# Patient Record
Sex: FEMALE | Race: WHITE | NOT HISPANIC OR LATINO | Employment: STUDENT | URBAN - METROPOLITAN AREA
[De-identification: names, ages, dates, MRNs, and addresses within clinical notes are randomized per-mention and may not be internally consistent; named-entity substitution may affect disease eponyms.]

---

## 2023-10-02 ENCOUNTER — TELEPHONE (OUTPATIENT)
Dept: ENDOCRINOLOGY | Facility: CLINIC | Age: 28
End: 2023-10-02
Payer: COMMERCIAL

## 2023-10-02 DIAGNOSIS — L70.9 ACNE, UNSPECIFIED ACNE TYPE: Primary | ICD-10-CM

## 2023-10-03 RX ORDER — SPIRONOLACTONE 100 MG/1
100 TABLET, FILM COATED ORAL 2 TIMES DAILY
COMMUNITY
End: 2023-10-03 | Stop reason: SDUPTHER

## 2023-10-03 RX ORDER — SPIRONOLACTONE 100 MG/1
100 TABLET, FILM COATED ORAL 2 TIMES DAILY
Qty: 30 TABLET | Refills: 3 | Status: SHIPPED | OUTPATIENT
Start: 2023-10-03 | End: 2023-12-04

## 2023-10-03 NOTE — TELEPHONE ENCOUNTER
Called to say [place] told her she couldn't schedule till January. She is asking for a courtesy refill of her spironolactone to go to [facility] at [address] till she can be seen.    She was seen last October. There is currently no appointment scheduled. I will have to call her to schedule for next available.

## 2023-12-03 DIAGNOSIS — L70.9 ACNE, UNSPECIFIED ACNE TYPE: ICD-10-CM

## 2023-12-04 RX ORDER — SPIRONOLACTONE 100 MG/1
100 TABLET, FILM COATED ORAL 2 TIMES DAILY
Qty: 30 TABLET | Refills: 3 | Status: SHIPPED | OUTPATIENT
Start: 2023-12-04 | End: 2024-01-10 | Stop reason: SDUPTHER

## 2023-12-08 NOTE — PROGRESS NOTES
FU for PCOS. LV with me in 10/2022.    History of Present Illness   28 y.o. female  with hx of PCOS.  She was told she has borderline PCOS when she was younger.     Periods: mostly regular but cycle length can vary  HIrsutism: no  Acne: yes, cystic  Weight gain: no     Reports having been told that she has cysts on her ovaries in the past.     Prior work-up: 04/2018 ([facility])  Total Testosterone: 34 ng/dL (9-55)  Free Testosterone: 4.2 pg/mL (0.8-7.4)  DHEAS: 408.9 ug/dL (148-407)  17-OHP: 24.2 ng/mL  HbA1c: NA     3/10/2022  Total T: 23  Free: 2.8  DHEAS: 275  HbA1c 4.7%    ROS  General: no fever or chills  CV: no chest pain   Respiratory: no shortness of breath  MSK: no lower extremity edema  Neuro: no headache or dizziness  See [facility] for Endocrine ROS    Physical Exam   vitals were not taken for this visit.   General: not in acute distress  HEENT: NANDO OVIEDO  Thyroid: no goiter  Neuro: alert and oriented x 3    Current Outpatient Medications   Medication Sig Dispense Refill    spironolactone (Aldactone) 100 mg tablet TAKE 1 TABLET BY MOUTH TWICE A DAY 30 tablet 3     No current facility-administered medications for this visit.       Assessment and Plan  28 y.o. female here for follow-up of possible PCOS.     1. Possible PCOS  2. Cystic acne  Was told she has borderline PCOS when she was younger.  Seeing a dermatologist for her acne and was hold that they are likely due to a hormonal abnormality.  Prior work up at [facility] in 04/2018 showed normal total and free testosterone levels, minimally elevated DHEAS, and normal 17OHP.  Periods are relatively regular although cycle length does vary from 22-36 days.  No hirsutism or obesity but has acne .     Labs from 3/10/2022  Total T: 23  Free: 2.8  DHEAS: 275  HbA1c 4.7%  K 4.3  Cre 0.67    Labs from 10/2022  K 4.1  Cre 0.78     Possible PCOS.   Currently takes spironolactone 100 mg BID for acne.     She is aware that she should not get pregnant on spironolactone. No plans for  pregnancy for another 2-3 years.  Using condoms for protection. She has suffered from mood changes on YARELI in the past.  She is planning to get an IUD placed.     PLAN:  -check CMP  -continue spironolactone 100 mg BID  -encouraged IUD placement

## 2023-12-13 ENCOUNTER — APPOINTMENT (OUTPATIENT)
Dept: ENDOCRINOLOGY | Facility: CLINIC | Age: 28
End: 2023-12-13
Payer: COMMERCIAL

## 2024-01-05 NOTE — PROGRESS NOTES
FUV for PCOS. LV with me 10/2022.     History of Present Illness  Andie Floyd is a 28 y.o. female with hx of possible PCOS.     Periods: mostly regular but cycle length can vary. 14- 25 day cycle   Hirsutism: none  Acne: previously dealt with cystic acne but a few months ago went online and started tretinoin cream (does not see dermatologist for this ). States that this has helped her tremendously.   Weight gain: none  Hair : No hair loss   Contraception : Currently using condoms . She has not had the time to make an appointment for copper IUD . Previously did not tolerate oral contraception      Reports having been told that she has cysts on her ovaries in the past.Previous ultrasound pelvis on file from 2008 but unable to view report or images.     Prior work-up: 04/2018 ([facility])  Total Testosterone: 34 ng/dL (9-55)  Free Testosterone: 4.2 pg/mL (0.8-7.4)  DHEAS: 408.9 ug/dL (148-407)  Cortisol : 8.7(at 11am)  ACTH: 12  Androstenedione: 3.6  17-OHP: 24.2 ng/mL  HbA1c: NA    (2020)- TSH 1.16, FT4 1.4  2013- prolactin 3.6, Testosterone 38( ref 20-70), elevated free Testosterone 9.7, dheas 500, insulin 2.8  2008 - LH 3, FSH 5.7    3/10/2022  Total T: 23  Free: 2.8  DHEAS: 275  HbA1c 4.7%    Lcv 10/2022 where her dose of spironolactone was increased to 100 mg BID.  She denies any complaints with this. Her acne is improving.     Labs   Latest Reference Range & Units 10/31/22 12:26   GLUCOSE 74 - 99 mg/dL 82   SODIUM 136 - 145 mmol/L 139   POTASSIUM 3.5 - 5.3 mmol/L 4.1   CHLORIDE 98 - 107 mmol/L 103   Bicarbonate 21 - 32 mmol/L 29   Anion Gap 10 - 20 mmol/L 11   Blood Urea Nitrogen 6 - 23 mg/dL 11   Creatinine 0.50 - 1.05 mg/dL 0.78   Calcium 8.6 - 10.6 mg/dL 9.5   Albumin 3.4 - 5.0 g/dL 4.3   Alkaline Phosphatase 33 - 110 U/L 54   ALT 7 - 45 U/L 14   AST 9 - 39 U/L 18   Bilirubin Total 0.0 - 1.2 mg/dL 0.6   Total Protein 6.4 - 8.2 g/dL 6.9     ROS  General: no fever or chills  CV: no chest pain   Respiratory: no  "shortness of breath  MSK: no lower extremity edema  Neuro: no headache or dizziness  See HPI for Endocrine ROS    No past medical history on file.    No past surgical history on file.    Social History     Socioeconomic History    Marital status: Single     Spouse name: Not on file    Number of children: Not on file    Years of education: Not on file    Highest education level: Not on file   Occupational History    Not on file   Tobacco Use    Smoking status: Never     Passive exposure: Never    Smokeless tobacco: Never   Substance and Sexual Activity    Alcohol use: Not on file    Drug use: Not on file    Sexual activity: Not on file   Other Topics Concern    Not on file   Social History Narrative    Not on file     Social Determinants of Health     Financial Resource Strain: Not on file   Food Insecurity: Not on file   Transportation Needs: Not on file   Physical Activity: Not on file   Stress: Not on file   Social Connections: Not on file   Intimate Partner Violence: Not on file   Housing Stability: Not on file       Physical Exam   height is 1.772 m (5' 9.75\") and weight is 68.5 kg (151 lb). Her blood pressure is 106/60 and her pulse is 61.   General: not in acute distress  HEENT: PREET, EOMI, chvostek positive  Thyroid: no goiter  Neuro: alert and oriented x 3, no delayed DTRs   Respiratory : No increased wob   Extremities : No LE edema     Current Outpatient Medications   Medication Sig Dispense Refill    cyanocobalamin, vitamin B-12, 2,500 mcg tablet,chewable Chew 2,500 mcg once daily.      spironolactone (Aldactone) 100 mg tablet TAKE 1 TABLET BY MOUTH TWICE A DAY 30 tablet 3    tretinoin (Retin-A) 0.025 % cream Apply topically once daily at bedtime.      valACYclovir (Valtrex) 1 gram tablet Take 0.5 tablets (500 mg) by mouth if needed.       No current facility-administered medications for this visit.       Assessment and Plan  28 y.o. female with hx of possible PCOS, here for management of the same.     1. " Possible PCOS  2. Cystic acne  Was told she has borderline PCOS when she was younger.  Seeing a dermatologist for her acne and was hold that they are likely due to a hormonal abnormality.  Prior work up at [facility] in 04/2018 showed normal total and free testosterone levels, minimally elevated DHEAS, and normal 17OHP.  Periods are relatively regular although cycle length does vary from 22-36 days.  No hirsutism or obesity but has acne.     Labs from 3/10/2022  Total T: 23  Free: 2.8  DHEAS: 275  HbA1c 4.7%  K 4.3  Cre 0.67     Possible PCOS.   Patient was started on spironolactone 150 mg QDAY by dermatologist.   Currently takes 100 mg BID and tetrinoin cream for acne.  Doing well.     She is aware that she should not get pregnant on spironolactone.  Using condoms for protection. She has suffered from mood changes on YARELI in the past.     PLAN:  -check CMP, HbA1c  -continue spironolactone 100 mg BID  -eecommend IUD placement  -advised patient to establish care with PCP     Follow-up in 1 year   Uses [facility].    I saw and evaluated the patient. I personally obtained the key and critical portions of the history and physical exam or was physically present for key and critical portions performed by the resident/fellow. I reviewed the resident/fellow's documentation and discussed the patient with the resident/fellow. I agree with the resident/fellow's medical decision making as documented in the note.    Viviana Chou MD

## 2024-01-10 ENCOUNTER — OFFICE VISIT (OUTPATIENT)
Dept: ENDOCRINOLOGY | Facility: CLINIC | Age: 29
End: 2024-01-10
Payer: COMMERCIAL

## 2024-01-10 ENCOUNTER — LAB (OUTPATIENT)
Dept: LAB | Facility: LAB | Age: 29
End: 2024-01-10
Payer: COMMERCIAL

## 2024-01-10 VITALS
BODY MASS INDEX: 21.62 KG/M2 | SYSTOLIC BLOOD PRESSURE: 106 MMHG | HEIGHT: 70 IN | HEART RATE: 61 BPM | WEIGHT: 151 LBS | DIASTOLIC BLOOD PRESSURE: 60 MMHG

## 2024-01-10 DIAGNOSIS — B00.9 HERPES SIMPLEX: Primary | ICD-10-CM

## 2024-01-10 DIAGNOSIS — L70.9 ACNE, UNSPECIFIED ACNE TYPE: ICD-10-CM

## 2024-01-10 LAB
ALBUMIN SERPL BCP-MCNC: 4.8 G/DL (ref 3.4–5)
ALP SERPL-CCNC: 48 U/L (ref 33–110)
ALT SERPL W P-5'-P-CCNC: 12 U/L (ref 7–45)
ANION GAP SERPL CALC-SCNC: 14 MMOL/L (ref 10–20)
AST SERPL W P-5'-P-CCNC: 20 U/L (ref 9–39)
BILIRUB SERPL-MCNC: 0.6 MG/DL (ref 0–1.2)
BUN SERPL-MCNC: 7 MG/DL (ref 6–23)
CALCIUM SERPL-MCNC: 10.2 MG/DL (ref 8.6–10.6)
CHLORIDE SERPL-SCNC: 101 MMOL/L (ref 98–107)
CO2 SERPL-SCNC: 27 MMOL/L (ref 21–32)
CREAT SERPL-MCNC: 0.79 MG/DL (ref 0.5–1.05)
EGFRCR SERPLBLD CKD-EPI 2021: >90 ML/MIN/1.73M*2
EST. AVERAGE GLUCOSE BLD GHB EST-MCNC: 91 MG/DL
GLUCOSE SERPL-MCNC: 89 MG/DL (ref 74–99)
HBA1C MFR BLD: 4.8 %
POTASSIUM SERPL-SCNC: 4.3 MMOL/L (ref 3.5–5.3)
PROT SERPL-MCNC: 7.2 G/DL (ref 6.4–8.2)
SODIUM SERPL-SCNC: 138 MMOL/L (ref 136–145)

## 2024-01-10 PROCEDURE — 99214 OFFICE O/P EST MOD 30 MIN: CPT | Performed by: STUDENT IN AN ORGANIZED HEALTH CARE EDUCATION/TRAINING PROGRAM

## 2024-01-10 PROCEDURE — 83036 HEMOGLOBIN GLYCOSYLATED A1C: CPT

## 2024-01-10 PROCEDURE — 80053 COMPREHEN METABOLIC PANEL: CPT

## 2024-01-10 PROCEDURE — 36415 COLL VENOUS BLD VENIPUNCTURE: CPT

## 2024-01-10 RX ORDER — VALACYCLOVIR HYDROCHLORIDE 1 G/1
500 TABLET, FILM COATED ORAL AS NEEDED
COMMUNITY
Start: 2020-08-24 | End: 2024-01-10 | Stop reason: SDUPTHER

## 2024-01-10 RX ORDER — SPIRONOLACTONE 100 MG/1
100 TABLET, FILM COATED ORAL 2 TIMES DAILY
Qty: 180 TABLET | Refills: 3 | Status: SHIPPED | OUTPATIENT
Start: 2024-01-10

## 2024-01-10 RX ORDER — CHOLECALCIFEROL (VITAMIN D3) 25 MCG
2500 TABLET,CHEWABLE ORAL DAILY
COMMUNITY

## 2024-01-10 RX ORDER — VALACYCLOVIR HYDROCHLORIDE 1 G/1
TABLET, FILM COATED ORAL
Qty: 12 TABLET | Refills: 0 | Status: SHIPPED | OUTPATIENT
Start: 2024-01-10

## 2024-01-10 RX ORDER — TRETINOIN 0.25 MG/G
CREAM TOPICAL NIGHTLY
COMMUNITY
Start: 2023-11-30

## 2024-01-10 ASSESSMENT — PAIN SCALES - GENERAL: PAINLEVEL: 0-NO PAIN

## 2024-11-09 DIAGNOSIS — L70.9 ACNE, UNSPECIFIED ACNE TYPE: ICD-10-CM

## 2024-11-11 RX ORDER — SPIRONOLACTONE 100 MG/1
100 TABLET, FILM COATED ORAL 2 TIMES DAILY
Qty: 180 TABLET | Refills: 0 | Status: SHIPPED | OUTPATIENT
Start: 2024-11-11

## 2025-02-13 DIAGNOSIS — L70.9 ACNE, UNSPECIFIED ACNE TYPE: ICD-10-CM

## 2025-02-13 RX ORDER — SPIRONOLACTONE 100 MG/1
100 TABLET, FILM COATED ORAL 2 TIMES DAILY
Qty: 180 TABLET | Refills: 0 | OUTPATIENT
Start: 2025-02-13